# Patient Record
Sex: MALE | Race: WHITE | NOT HISPANIC OR LATINO | URBAN - METROPOLITAN AREA
[De-identification: names, ages, dates, MRNs, and addresses within clinical notes are randomized per-mention and may not be internally consistent; named-entity substitution may affect disease eponyms.]

---

## 2018-02-06 ENCOUNTER — EMERGENCY (EMERGENCY)
Facility: HOSPITAL | Age: 48
LOS: 1 days | Discharge: HOME | End: 2018-02-06
Attending: EMERGENCY MEDICINE | Admitting: EMERGENCY MEDICINE
Payer: COMMERCIAL

## 2018-02-06 VITALS
DIASTOLIC BLOOD PRESSURE: 78 MMHG | SYSTOLIC BLOOD PRESSURE: 117 MMHG | OXYGEN SATURATION: 98 % | HEART RATE: 65 BPM | TEMPERATURE: 98 F | RESPIRATION RATE: 18 BRPM | HEIGHT: 68 IN | WEIGHT: 190.04 LBS

## 2018-02-06 PROCEDURE — 99282 EMERGENCY DEPT VISIT SF MDM: CPT

## 2018-02-06 PROCEDURE — 99283 EMERGENCY DEPT VISIT LOW MDM: CPT

## 2018-02-06 NOTE — ED PROVIDER NOTE - OBJECTIVE STATEMENT
48 yo white male, metal vs left eye few days ago, was OK until yesterday morning when he woke up with slight discomfort and redness. No altered vision. No fever or chills and no nausea/vomiting.

## 2018-02-06 NOTE — ED PROVIDER NOTE - EYES, MLM
Clear bilaterally, pupils equal, round and reactive to light. Left eye at 11:00 there appears to be dark brown FB/Rust Ring on cornea

## 2018-02-06 NOTE — ED ADULT NURSE NOTE - OBJECTIVE STATEMENT
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  He immediately flushed it and thought he had gotten it our.  Yesterday he had the sensation of perhaps a lash in his eye.  Today he woke up with left eye reddened, crusted over and irritated.  On exam, he has a rust ring at 11:00.

## 2018-04-27 NOTE — ED PROVIDER NOTE - NSCAREINITIATED _GEN_ER
Stop Prevacid  Tylenol as need for pain  CRP, CK, CMV, lyme western blot, toxoplamosis titer  Return in 2 weeks pending progress and above  In interim encourage [employer] with meals
Jose Fournier(Attending)